# Patient Record
Sex: MALE | Race: WHITE | NOT HISPANIC OR LATINO | Employment: FULL TIME | URBAN - METROPOLITAN AREA
[De-identification: names, ages, dates, MRNs, and addresses within clinical notes are randomized per-mention and may not be internally consistent; named-entity substitution may affect disease eponyms.]

---

## 2024-04-01 ENCOUNTER — OFFICE VISIT (OUTPATIENT)
Dept: PRIMARY CARE | Facility: CLINIC | Age: 39
End: 2024-04-01
Payer: COMMERCIAL

## 2024-04-01 VITALS
HEART RATE: 77 BPM | DIASTOLIC BLOOD PRESSURE: 90 MMHG | SYSTOLIC BLOOD PRESSURE: 136 MMHG | TEMPERATURE: 98.1 F | HEIGHT: 73 IN | WEIGHT: 198.8 LBS | BODY MASS INDEX: 26.35 KG/M2

## 2024-04-01 DIAGNOSIS — Z00.00 WELLNESS EXAMINATION: ICD-10-CM

## 2024-04-01 DIAGNOSIS — Z80.9 FAMILY HX-MALIGNANCY: ICD-10-CM

## 2024-04-01 DIAGNOSIS — Z00.00 WELL ADULT HEALTH CHECK: ICD-10-CM

## 2024-04-01 DIAGNOSIS — R03.0 ELEVATED BLOOD PRESSURE READING: Primary | ICD-10-CM

## 2024-04-01 DIAGNOSIS — G47.00 INSOMNIA, UNSPECIFIED TYPE: ICD-10-CM

## 2024-04-01 PROBLEM — R07.9 CHEST PAIN: Status: ACTIVE | Noted: 2024-04-01

## 2024-04-01 PROBLEM — J06.9 URI WITH COUGH AND CONGESTION: Status: ACTIVE | Noted: 2024-04-01

## 2024-04-01 PROBLEM — R53.1 RIGHT SIDED WEAKNESS: Status: ACTIVE | Noted: 2024-04-01

## 2024-04-01 PROBLEM — M54.16 LUMBAR RADICULOPATHY: Status: ACTIVE | Noted: 2024-04-01

## 2024-04-01 PROBLEM — S60.011A CONTUSION OF RIGHT THUMB WITHOUT DAMAGE TO NAIL: Status: ACTIVE | Noted: 2024-04-01

## 2024-04-01 PROBLEM — F17.200 SMOKER: Status: ACTIVE | Noted: 2018-11-09

## 2024-04-01 PROBLEM — M54.30 SCIATICA: Status: ACTIVE | Noted: 2024-04-01

## 2024-04-01 PROBLEM — S76.311A STRAIN OF RIGHT HAMSTRING: Status: ACTIVE | Noted: 2024-04-01

## 2024-04-01 PROBLEM — M54.31 BACK PAIN WITH RIGHT-SIDED SCIATICA: Status: ACTIVE | Noted: 2024-04-01

## 2024-04-01 PROBLEM — M79.645 PAIN OF FINGER OF LEFT HAND: Status: ACTIVE | Noted: 2024-04-01

## 2024-04-01 PROBLEM — Z20.822 ENCOUNTER BY TELEHEALTH FOR SUSPECTED COVID-19: Status: ACTIVE | Noted: 2024-04-01

## 2024-04-01 PROBLEM — K21.9 GERD (GASTROESOPHAGEAL REFLUX DISEASE): Status: ACTIVE | Noted: 2024-04-01

## 2024-04-01 PROBLEM — S62.524A CLOSED NONDISPLACED FRACTURE OF DISTAL PHALANX OF RIGHT THUMB: Status: ACTIVE | Noted: 2024-04-01

## 2024-04-01 PROCEDURE — 99395 PREV VISIT EST AGE 18-39: CPT | Performed by: INTERNAL MEDICINE

## 2024-04-01 PROCEDURE — 1036F TOBACCO NON-USER: CPT | Performed by: INTERNAL MEDICINE

## 2024-04-01 RX ORDER — OMEPRAZOLE 20 MG/1
20 CAPSULE, DELAYED RELEASE ORAL
COMMUNITY

## 2024-04-01 RX ORDER — ZOLPIDEM TARTRATE 5 MG/1
5 TABLET ORAL NIGHTLY PRN
Qty: 30 TABLET | Refills: 0 | Status: SHIPPED | OUTPATIENT
Start: 2024-04-01 | End: 2024-05-01

## 2024-04-01 ASSESSMENT — PROMIS GLOBAL HEALTH SCALE
RATE_MENTAL_HEALTH: FAIR
RATE_GENERAL_HEALTH: VERY GOOD
RATE_PHYSICAL_HEALTH: FAIR
RATE_QUALITY_OF_LIFE: GOOD
CARRYOUT_PHYSICAL_ACTIVITIES: COMPLETELY
EMOTIONAL_PROBLEMS: SOMETIMES
RATE_AVERAGE_FATIGUE: MODERATE
RATE_AVERAGE_PAIN: 5
RATE_SOCIAL_SATISFACTION: GOOD
CARRYOUT_SOCIAL_ACTIVITIES: GOOD

## 2024-04-01 ASSESSMENT — PATIENT HEALTH QUESTIONNAIRE - PHQ9
2. FEELING DOWN, DEPRESSED OR HOPELESS: NOT AT ALL
SUM OF ALL RESPONSES TO PHQ9 QUESTIONS 1 AND 2: 0
1. LITTLE INTEREST OR PLEASURE IN DOING THINGS: NOT AT ALL

## 2024-04-01 NOTE — PROGRESS NOTES
Assessment/Plan   Problem List Items Addressed This Visit       Elevated blood pressure reading - Primary    Family hx-malignancy    Well adult health check    Relevant Orders    Lipid Panel    Comprehensive Metabolic Panel    Insomnia    Relevant Medications    zolpidem (Ambien) 5 mg tablet   Came for wellness visit found elevated blood pressure  Will revisit this and follow-up appointment  Nonpharmacological measures to continue  Family history of pancreatic cancer dad and aunt  for  He had recent CT scan of the abdominal and ER visit 6-month ago which was normal lipase was normal  His immunization was reviewed  Labs ordered    Subjective   Patient ID: Robi Rogers is a 38 y.o. male who presents for Annual Exam (Patient states that he has a hard time with sleeping some nights. ).    Past Surgical History:   Procedure Laterality Date    HERNIA REPAIR      OTHER SURGICAL HISTORY  2019    Hernia repair      Family History   Problem Relation Name Age of Onset    Cancer Father      Diabetes Father        Social History     Socioeconomic History    Marital status:      Spouse name: Not on file    Number of children: Not on file    Years of education: Not on file    Highest education level: Not on file   Occupational History    Not on file   Tobacco Use    Smoking status: Former     Packs/day: 1.5     Types: Cigarettes     Quit date:      Years since quitting: 3.2    Smokeless tobacco: Never   Substance and Sexual Activity    Alcohol use: Yes    Drug use: Yes     Types: Marijuana    Sexual activity: Not on file   Other Topics Concern    Not on file   Social History Narrative    Not on file     Social Determinants of Health     Financial Resource Strain: Not on file   Food Insecurity: Not on file   Transportation Needs: Not on file   Physical Activity: Not on file   Stress: Not on file   Social Connections: Not on file   Intimate Partner Violence: Not on file   Housing Stability: Not on file     "  Bee venom protein (honey bee)   Current Outpatient Medications   Medication Sig Dispense Refill    omeprazole (PriLOSEC) 20 mg DR capsule Take 1 capsule (20 mg) by mouth. Do not crush or chew.      zolpidem (Ambien) 5 mg tablet Take 1 tablet (5 mg) by mouth as needed at bedtime for sleep. 30 tablet 0     No current facility-administered medications for this visit.      Vitals:    04/01/24 1646   BP: 136/90   BP Location: Left arm   Patient Position: Sitting   Pulse: 77   Temp: 36.7 °C (98.1 °F)   Weight: 90.2 kg (198 lb 12.8 oz)   Height: 1.854 m (6' 1\")      Problem List Items Addressed This Visit       Elevated blood pressure reading - Primary    Family hx-malignancy    Well adult health check    Relevant Orders    Lipid Panel    Comprehensive Metabolic Panel    Insomnia    Relevant Medications    zolpidem (Ambien) 5 mg tablet      Orders Placed This Encounter   Procedures    Lipid Panel     Standing Status:   Future     Standing Expiration Date:   4/1/2025     Order Specific Question:   [facility]     Answer:   Immediate    Comprehensive Metabolic Panel     Standing Status:   Future     Standing Expiration Date:   4/1/2025     Order Specific Question:   Release result to [facility]     Answer:   Immediate        HPI  Came for periodic review  Is also complaining of insomnia  Otherwise fine  Mentions again family history of pancreatic cancer  ROS negative  Past medical history reviewed  Social and family history reviewed  Allergies and medications reviewed  Recent labs reviewed  Vital signs reviewed    PHYSICAL EXAM  Cardiovascular chest abdominal neurological examination normal      No results found for: \"PR1\", \"BMPR1A\", \"CMPLAS\", \"MP7NDCQH\", \"KPSAT\"   No results found for: \"CHOL\", \"LDLCALC\", \"HDLC\", \"LCTRG\", \"CHHDL\"             "

## 2024-06-18 ENCOUNTER — HOSPITAL ENCOUNTER (OUTPATIENT)
Facility: HOSPITAL | Age: 39
Setting detail: OBSERVATION
Discharge: HOME | End: 2024-06-19
Attending: STUDENT IN AN ORGANIZED HEALTH CARE EDUCATION/TRAINING PROGRAM | Admitting: STUDENT IN AN ORGANIZED HEALTH CARE EDUCATION/TRAINING PROGRAM
Payer: COMMERCIAL

## 2024-06-18 ENCOUNTER — APPOINTMENT (OUTPATIENT)
Dept: RADIOLOGY | Facility: HOSPITAL | Age: 39
End: 2024-06-18
Payer: COMMERCIAL

## 2024-06-18 ENCOUNTER — ANESTHESIA EVENT (OUTPATIENT)
Dept: OPERATING ROOM | Facility: HOSPITAL | Age: 39
End: 2024-06-18
Payer: COMMERCIAL

## 2024-06-18 ENCOUNTER — ANESTHESIA (OUTPATIENT)
Dept: OPERATING ROOM | Facility: HOSPITAL | Age: 39
End: 2024-06-18
Payer: COMMERCIAL

## 2024-06-18 DIAGNOSIS — K35.80 ACUTE APPENDICITIS, UNSPECIFIED ACUTE APPENDICITIS TYPE: Primary | ICD-10-CM

## 2024-06-18 LAB
ALBUMIN SERPL BCP-MCNC: 4.7 G/DL (ref 3.4–5)
ALP SERPL-CCNC: 83 U/L (ref 33–120)
ALT SERPL W P-5'-P-CCNC: 42 U/L (ref 10–52)
ANION GAP SERPL CALC-SCNC: 12 MMOL/L (ref 10–20)
APPEARANCE UR: CLEAR
AST SERPL W P-5'-P-CCNC: 25 U/L (ref 9–39)
BASOPHILS # BLD AUTO: 0.05 X10*3/UL (ref 0–0.1)
BASOPHILS NFR BLD AUTO: 0.7 %
BILIRUB SERPL-MCNC: 0.5 MG/DL (ref 0–1.2)
BILIRUB UR STRIP.AUTO-MCNC: NEGATIVE MG/DL
BUN SERPL-MCNC: 14 MG/DL (ref 6–23)
CALCIUM SERPL-MCNC: 9.5 MG/DL (ref 8.6–10.3)
CHLORIDE SERPL-SCNC: 106 MMOL/L (ref 98–107)
CO2 SERPL-SCNC: 25 MMOL/L (ref 21–32)
COLOR UR: ABNORMAL
CREAT SERPL-MCNC: 0.92 MG/DL (ref 0.5–1.3)
EGFRCR SERPLBLD CKD-EPI 2021: >90 ML/MIN/1.73M*2
EOSINOPHIL # BLD AUTO: 0.18 X10*3/UL (ref 0–0.7)
EOSINOPHIL NFR BLD AUTO: 2.7 %
ERYTHROCYTE [DISTWIDTH] IN BLOOD BY AUTOMATED COUNT: 12.9 % (ref 11.5–14.5)
GLUCOSE SERPL-MCNC: 110 MG/DL (ref 74–99)
GLUCOSE UR STRIP.AUTO-MCNC: NORMAL MG/DL
HCT VFR BLD AUTO: 46.2 % (ref 41–52)
HGB BLD-MCNC: 15.7 G/DL (ref 13.5–17.5)
IMM GRANULOCYTES # BLD AUTO: 0.03 X10*3/UL (ref 0–0.7)
IMM GRANULOCYTES NFR BLD AUTO: 0.4 % (ref 0–0.9)
KETONES UR STRIP.AUTO-MCNC: NEGATIVE MG/DL
LEUKOCYTE ESTERASE UR QL STRIP.AUTO: NEGATIVE
LIPASE SERPL-CCNC: 21 U/L (ref 9–82)
LYMPHOCYTES # BLD AUTO: 2.18 X10*3/UL (ref 1.2–4.8)
LYMPHOCYTES NFR BLD AUTO: 32.3 %
MCH RBC QN AUTO: 29.7 PG (ref 26–34)
MCHC RBC AUTO-ENTMCNC: 34 G/DL (ref 32–36)
MCV RBC AUTO: 87 FL (ref 80–100)
MONOCYTES # BLD AUTO: 0.41 X10*3/UL (ref 0.1–1)
MONOCYTES NFR BLD AUTO: 6.1 %
NEUTROPHILS # BLD AUTO: 3.89 X10*3/UL (ref 1.2–7.7)
NEUTROPHILS NFR BLD AUTO: 57.8 %
NITRITE UR QL STRIP.AUTO: NEGATIVE
NRBC BLD-RTO: 0 /100 WBCS (ref 0–0)
PH UR STRIP.AUTO: 5.5 [PH]
PLATELET # BLD AUTO: 271 X10*3/UL (ref 150–450)
POTASSIUM SERPL-SCNC: 3.7 MMOL/L (ref 3.5–5.3)
PROT SERPL-MCNC: 7.6 G/DL (ref 6.4–8.2)
PROT UR STRIP.AUTO-MCNC: NEGATIVE MG/DL
RBC # BLD AUTO: 5.29 X10*6/UL (ref 4.5–5.9)
RBC # UR STRIP.AUTO: ABNORMAL /UL
RBC #/AREA URNS AUTO: NORMAL /HPF
SODIUM SERPL-SCNC: 139 MMOL/L (ref 136–145)
SP GR UR STRIP.AUTO: 1.03
UROBILINOGEN UR STRIP.AUTO-MCNC: NORMAL MG/DL
WBC # BLD AUTO: 6.7 X10*3/UL (ref 4.4–11.3)
WBC #/AREA URNS AUTO: NORMAL /HPF

## 2024-06-18 PROCEDURE — 88304 TISSUE EXAM BY PATHOLOGIST: CPT | Performed by: PATHOLOGY

## 2024-06-18 PROCEDURE — 3600000009 HC OR TIME - EACH INCREMENTAL 1 MINUTE - PROCEDURE LEVEL FOUR: Performed by: STUDENT IN AN ORGANIZED HEALTH CARE EDUCATION/TRAINING PROGRAM

## 2024-06-18 PROCEDURE — 85025 COMPLETE CBC W/AUTO DIFF WBC: CPT | Performed by: STUDENT IN AN ORGANIZED HEALTH CARE EDUCATION/TRAINING PROGRAM

## 2024-06-18 PROCEDURE — 99221 1ST HOSP IP/OBS SF/LOW 40: CPT | Performed by: STUDENT IN AN ORGANIZED HEALTH CARE EDUCATION/TRAINING PROGRAM

## 2024-06-18 PROCEDURE — 36415 COLL VENOUS BLD VENIPUNCTURE: CPT | Performed by: EMERGENCY MEDICINE

## 2024-06-18 PROCEDURE — 7100000002 HC RECOVERY ROOM TIME - EACH INCREMENTAL 1 MINUTE: Performed by: STUDENT IN AN ORGANIZED HEALTH CARE EDUCATION/TRAINING PROGRAM

## 2024-06-18 PROCEDURE — A4217 STERILE WATER/SALINE, 500 ML: HCPCS | Performed by: STUDENT IN AN ORGANIZED HEALTH CARE EDUCATION/TRAINING PROGRAM

## 2024-06-18 PROCEDURE — 2500000004 HC RX 250 GENERAL PHARMACY W/ HCPCS (ALT 636 FOR OP/ED): Performed by: NURSE ANESTHETIST, CERTIFIED REGISTERED

## 2024-06-18 PROCEDURE — 80053 COMPREHEN METABOLIC PANEL: CPT | Performed by: STUDENT IN AN ORGANIZED HEALTH CARE EDUCATION/TRAINING PROGRAM

## 2024-06-18 PROCEDURE — 81001 URINALYSIS AUTO W/SCOPE: CPT | Performed by: EMERGENCY MEDICINE

## 2024-06-18 PROCEDURE — 3600000004 HC OR TIME - INITIAL BASE CHARGE - PROCEDURE LEVEL FOUR: Performed by: STUDENT IN AN ORGANIZED HEALTH CARE EDUCATION/TRAINING PROGRAM

## 2024-06-18 PROCEDURE — 81001 URINALYSIS AUTO W/SCOPE: CPT | Performed by: STUDENT IN AN ORGANIZED HEALTH CARE EDUCATION/TRAINING PROGRAM

## 2024-06-18 PROCEDURE — 84075 ASSAY ALKALINE PHOSPHATASE: CPT | Performed by: EMERGENCY MEDICINE

## 2024-06-18 PROCEDURE — 83690 ASSAY OF LIPASE: CPT | Performed by: EMERGENCY MEDICINE

## 2024-06-18 PROCEDURE — 3700000002 HC GENERAL ANESTHESIA TIME - EACH INCREMENTAL 1 MINUTE: Performed by: STUDENT IN AN ORGANIZED HEALTH CARE EDUCATION/TRAINING PROGRAM

## 2024-06-18 PROCEDURE — 96365 THER/PROPH/DIAG IV INF INIT: CPT | Mod: 59 | Performed by: STUDENT IN AN ORGANIZED HEALTH CARE EDUCATION/TRAINING PROGRAM

## 2024-06-18 PROCEDURE — 99285 EMERGENCY DEPT VISIT HI MDM: CPT | Performed by: STUDENT IN AN ORGANIZED HEALTH CARE EDUCATION/TRAINING PROGRAM

## 2024-06-18 PROCEDURE — 74177 CT ABD & PELVIS W/CONTRAST: CPT

## 2024-06-18 PROCEDURE — 2500000004 HC RX 250 GENERAL PHARMACY W/ HCPCS (ALT 636 FOR OP/ED): Performed by: STUDENT IN AN ORGANIZED HEALTH CARE EDUCATION/TRAINING PROGRAM

## 2024-06-18 PROCEDURE — 3700000001 HC GENERAL ANESTHESIA TIME - INITIAL BASE CHARGE: Performed by: STUDENT IN AN ORGANIZED HEALTH CARE EDUCATION/TRAINING PROGRAM

## 2024-06-18 PROCEDURE — 99285 EMERGENCY DEPT VISIT HI MDM: CPT

## 2024-06-18 PROCEDURE — 83690 ASSAY OF LIPASE: CPT | Performed by: STUDENT IN AN ORGANIZED HEALTH CARE EDUCATION/TRAINING PROGRAM

## 2024-06-18 PROCEDURE — 7100000001 HC RECOVERY ROOM TIME - INITIAL BASE CHARGE: Performed by: STUDENT IN AN ORGANIZED HEALTH CARE EDUCATION/TRAINING PROGRAM

## 2024-06-18 PROCEDURE — 96375 TX/PRO/DX INJ NEW DRUG ADDON: CPT | Performed by: STUDENT IN AN ORGANIZED HEALTH CARE EDUCATION/TRAINING PROGRAM

## 2024-06-18 PROCEDURE — 0752T DGTZ GLS MCRSCP SLD LVL III: CPT | Mod: TC,STJLAB | Performed by: STUDENT IN AN ORGANIZED HEALTH CARE EDUCATION/TRAINING PROGRAM

## 2024-06-18 PROCEDURE — 44970 LAPAROSCOPY APPENDECTOMY: CPT | Performed by: STUDENT IN AN ORGANIZED HEALTH CARE EDUCATION/TRAINING PROGRAM

## 2024-06-18 PROCEDURE — 2550000001 HC RX 255 CONTRASTS: Performed by: STUDENT IN AN ORGANIZED HEALTH CARE EDUCATION/TRAINING PROGRAM

## 2024-06-18 PROCEDURE — 85025 COMPLETE CBC W/AUTO DIFF WBC: CPT | Performed by: EMERGENCY MEDICINE

## 2024-06-18 PROCEDURE — 2720000007 HC OR 272 NO HCPCS: Performed by: STUDENT IN AN ORGANIZED HEALTH CARE EDUCATION/TRAINING PROGRAM

## 2024-06-18 PROCEDURE — 74177 CT ABD & PELVIS W/CONTRAST: CPT | Performed by: RADIOLOGY

## 2024-06-18 PROCEDURE — G0378 HOSPITAL OBSERVATION PER HR: HCPCS

## 2024-06-18 PROCEDURE — 2500000005 HC RX 250 GENERAL PHARMACY W/O HCPCS: Performed by: NURSE ANESTHETIST, CERTIFIED REGISTERED

## 2024-06-18 PROCEDURE — 96361 HYDRATE IV INFUSION ADD-ON: CPT | Performed by: STUDENT IN AN ORGANIZED HEALTH CARE EDUCATION/TRAINING PROGRAM

## 2024-06-18 RX ORDER — FENTANYL CITRATE 50 UG/ML
25 INJECTION, SOLUTION INTRAMUSCULAR; INTRAVENOUS EVERY 5 MIN PRN
Status: DISCONTINUED | OUTPATIENT
Start: 2024-06-18 | End: 2024-06-18 | Stop reason: HOSPADM

## 2024-06-18 RX ORDER — NALOXONE HYDROCHLORIDE 0.4 MG/ML
0.2 INJECTION, SOLUTION INTRAMUSCULAR; INTRAVENOUS; SUBCUTANEOUS EVERY 5 MIN PRN
Status: DISCONTINUED | OUTPATIENT
Start: 2024-06-18 | End: 2024-06-19 | Stop reason: HOSPADM

## 2024-06-18 RX ORDER — SODIUM CHLORIDE, SODIUM LACTATE, POTASSIUM CHLORIDE, CALCIUM CHLORIDE 600; 310; 30; 20 MG/100ML; MG/100ML; MG/100ML; MG/100ML
50 INJECTION, SOLUTION INTRAVENOUS CONTINUOUS
Status: DISCONTINUED | OUTPATIENT
Start: 2024-06-19 | End: 2024-06-19

## 2024-06-18 RX ORDER — LABETALOL HYDROCHLORIDE 5 MG/ML
5 INJECTION, SOLUTION INTRAVENOUS ONCE AS NEEDED
Status: DISCONTINUED | OUTPATIENT
Start: 2024-06-18 | End: 2024-06-18 | Stop reason: HOSPADM

## 2024-06-18 RX ORDER — SODIUM CHLORIDE, SODIUM LACTATE, POTASSIUM CHLORIDE, CALCIUM CHLORIDE 600; 310; 30; 20 MG/100ML; MG/100ML; MG/100ML; MG/100ML
INJECTION, SOLUTION INTRAVENOUS CONTINUOUS PRN
Status: DISCONTINUED | OUTPATIENT
Start: 2024-06-18 | End: 2024-06-18

## 2024-06-18 RX ORDER — MIDAZOLAM HYDROCHLORIDE 1 MG/ML
INJECTION, SOLUTION INTRAMUSCULAR; INTRAVENOUS AS NEEDED
Status: DISCONTINUED | OUTPATIENT
Start: 2024-06-18 | End: 2024-06-18

## 2024-06-18 RX ORDER — ONDANSETRON 4 MG/1
4 TABLET, ORALLY DISINTEGRATING ORAL EVERY 8 HOURS PRN
Status: DISCONTINUED | OUTPATIENT
Start: 2024-06-18 | End: 2024-06-19 | Stop reason: HOSPADM

## 2024-06-18 RX ORDER — ONDANSETRON HYDROCHLORIDE 2 MG/ML
4 INJECTION, SOLUTION INTRAVENOUS ONCE AS NEEDED
Status: DISCONTINUED | OUTPATIENT
Start: 2024-06-18 | End: 2024-06-18 | Stop reason: HOSPADM

## 2024-06-18 RX ORDER — ROCURONIUM BROMIDE 50 MG/5 ML
SYRINGE (ML) INTRAVENOUS AS NEEDED
Status: DISCONTINUED | OUTPATIENT
Start: 2024-06-18 | End: 2024-06-18

## 2024-06-18 RX ORDER — FENTANYL CITRATE 50 UG/ML
INJECTION, SOLUTION INTRAMUSCULAR; INTRAVENOUS AS NEEDED
Status: DISCONTINUED | OUTPATIENT
Start: 2024-06-18 | End: 2024-06-18

## 2024-06-18 RX ORDER — ACETAMINOPHEN 325 MG/1
650 TABLET ORAL EVERY 6 HOURS
Status: DISCONTINUED | OUTPATIENT
Start: 2024-06-19 | End: 2024-06-19 | Stop reason: HOSPADM

## 2024-06-18 RX ORDER — ALBUTEROL SULFATE 0.83 MG/ML
2.5 SOLUTION RESPIRATORY (INHALATION) ONCE AS NEEDED
Status: DISCONTINUED | OUTPATIENT
Start: 2024-06-18 | End: 2024-06-18 | Stop reason: HOSPADM

## 2024-06-18 RX ORDER — ONDANSETRON HYDROCHLORIDE 2 MG/ML
INJECTION, SOLUTION INTRAVENOUS AS NEEDED
Status: DISCONTINUED | OUTPATIENT
Start: 2024-06-18 | End: 2024-06-18

## 2024-06-18 RX ORDER — PANTOPRAZOLE SODIUM 40 MG/1
40 TABLET, DELAYED RELEASE ORAL
Status: DISCONTINUED | OUTPATIENT
Start: 2024-06-19 | End: 2024-06-19 | Stop reason: HOSPADM

## 2024-06-18 RX ORDER — OXYCODONE HYDROCHLORIDE 5 MG/1
5 TABLET ORAL EVERY 6 HOURS PRN
Status: DISCONTINUED | OUTPATIENT
Start: 2024-06-18 | End: 2024-06-19 | Stop reason: HOSPADM

## 2024-06-18 RX ORDER — LIDOCAINE HYDROCHLORIDE 10 MG/ML
0.1 INJECTION, SOLUTION EPIDURAL; INFILTRATION; INTRACAUDAL; PERINEURAL ONCE
Status: CANCELLED | OUTPATIENT
Start: 2024-06-18 | End: 2024-06-18

## 2024-06-18 RX ORDER — OXYCODONE HYDROCHLORIDE 5 MG/1
5 TABLET ORAL EVERY 4 HOURS PRN
Status: DISCONTINUED | OUTPATIENT
Start: 2024-06-18 | End: 2024-06-18 | Stop reason: HOSPADM

## 2024-06-18 RX ORDER — ONDANSETRON HYDROCHLORIDE 2 MG/ML
4 INJECTION, SOLUTION INTRAVENOUS EVERY 8 HOURS PRN
Status: DISCONTINUED | OUTPATIENT
Start: 2024-06-18 | End: 2024-06-19 | Stop reason: HOSPADM

## 2024-06-18 RX ORDER — MEPERIDINE HYDROCHLORIDE 50 MG/ML
12.5 INJECTION INTRAMUSCULAR; INTRAVENOUS; SUBCUTANEOUS EVERY 10 MIN PRN
Status: DISCONTINUED | OUTPATIENT
Start: 2024-06-18 | End: 2024-06-18 | Stop reason: HOSPADM

## 2024-06-18 RX ORDER — SODIUM CHLORIDE, SODIUM LACTATE, POTASSIUM CHLORIDE, CALCIUM CHLORIDE 600; 310; 30; 20 MG/100ML; MG/100ML; MG/100ML; MG/100ML
100 INJECTION, SOLUTION INTRAVENOUS CONTINUOUS
Status: DISCONTINUED | OUTPATIENT
Start: 2024-06-18 | End: 2024-06-18 | Stop reason: HOSPADM

## 2024-06-18 RX ORDER — LIDOCAINE HYDROCHLORIDE 20 MG/ML
INJECTION, SOLUTION INFILTRATION; PERINEURAL AS NEEDED
Status: DISCONTINUED | OUTPATIENT
Start: 2024-06-18 | End: 2024-06-18

## 2024-06-18 RX ORDER — BUPIVACAINE HYDROCHLORIDE 5 MG/ML
INJECTION, SOLUTION EPIDURAL; INTRACAUDAL AS NEEDED
Status: DISCONTINUED | OUTPATIENT
Start: 2024-06-18 | End: 2024-06-18 | Stop reason: HOSPADM

## 2024-06-18 RX ORDER — KETOROLAC TROMETHAMINE 15 MG/ML
15 INJECTION, SOLUTION INTRAMUSCULAR; INTRAVENOUS ONCE
Status: COMPLETED | OUTPATIENT
Start: 2024-06-18 | End: 2024-06-18

## 2024-06-18 RX ORDER — MORPHINE SULFATE 4 MG/ML
4 INJECTION, SOLUTION INTRAMUSCULAR; INTRAVENOUS EVERY 4 HOURS PRN
Status: DISCONTINUED | OUTPATIENT
Start: 2024-06-18 | End: 2024-06-19 | Stop reason: HOSPADM

## 2024-06-18 RX ORDER — HEPARIN SODIUM 5000 [USP'U]/ML
5000 INJECTION, SOLUTION INTRAVENOUS; SUBCUTANEOUS EVERY 8 HOURS SCHEDULED
Status: DISCONTINUED | OUTPATIENT
Start: 2024-06-19 | End: 2024-06-19 | Stop reason: HOSPADM

## 2024-06-18 RX ORDER — PROPOFOL 10 MG/ML
INJECTION, EMULSION INTRAVENOUS AS NEEDED
Status: DISCONTINUED | OUTPATIENT
Start: 2024-06-18 | End: 2024-06-18

## 2024-06-18 RX ORDER — SODIUM CHLORIDE 0.9 G/100ML
IRRIGANT IRRIGATION AS NEEDED
Status: DISCONTINUED | OUTPATIENT
Start: 2024-06-18 | End: 2024-06-18 | Stop reason: HOSPADM

## 2024-06-18 RX ORDER — ONDANSETRON HYDROCHLORIDE 2 MG/ML
4 INJECTION, SOLUTION INTRAVENOUS ONCE
Status: COMPLETED | OUTPATIENT
Start: 2024-06-18 | End: 2024-06-18

## 2024-06-18 RX ORDER — PANTOPRAZOLE SODIUM 40 MG/10ML
40 INJECTION, POWDER, LYOPHILIZED, FOR SOLUTION INTRAVENOUS
Status: DISCONTINUED | OUTPATIENT
Start: 2024-06-19 | End: 2024-06-19 | Stop reason: HOSPADM

## 2024-06-18 SDOH — SOCIAL STABILITY: SOCIAL INSECURITY: HAVE YOU HAD THOUGHTS OF HARMING ANYONE ELSE?: NO

## 2024-06-18 SDOH — SOCIAL STABILITY: SOCIAL INSECURITY: WERE YOU ABLE TO COMPLETE ALL THE BEHAVIORAL HEALTH SCREENINGS?: YES

## 2024-06-18 SDOH — SOCIAL STABILITY: SOCIAL INSECURITY: ARE YOU OR HAVE YOU BEEN THREATENED OR ABUSED PHYSICALLY, EMOTIONALLY, OR SEXUALLY BY ANYONE?: NO

## 2024-06-18 SDOH — SOCIAL STABILITY: SOCIAL INSECURITY: ARE THERE ANY APPARENT SIGNS OF INJURIES/BEHAVIORS THAT COULD BE RELATED TO ABUSE/NEGLECT?: NO

## 2024-06-18 SDOH — SOCIAL STABILITY: SOCIAL INSECURITY: DO YOU FEEL ANYONE HAS EXPLOITED OR TAKEN ADVANTAGE OF YOU FINANCIALLY OR OF YOUR PERSONAL PROPERTY?: NO

## 2024-06-18 SDOH — SOCIAL STABILITY: SOCIAL INSECURITY: DO YOU FEEL UNSAFE GOING BACK TO THE PLACE WHERE YOU ARE LIVING?: NO

## 2024-06-18 SDOH — HEALTH STABILITY: MENTAL HEALTH: CURRENT SMOKER: 0

## 2024-06-18 SDOH — SOCIAL STABILITY: SOCIAL INSECURITY: ABUSE: ADULT

## 2024-06-18 SDOH — SOCIAL STABILITY: SOCIAL INSECURITY: HAS ANYONE EVER THREATENED TO HURT YOUR FAMILY OR YOUR PETS?: NO

## 2024-06-18 SDOH — SOCIAL STABILITY: SOCIAL INSECURITY: DOES ANYONE TRY TO KEEP YOU FROM HAVING/CONTACTING OTHER FRIENDS OR DOING THINGS OUTSIDE YOUR HOME?: NO

## 2024-06-18 SDOH — SOCIAL STABILITY: SOCIAL INSECURITY: HAVE YOU HAD ANY THOUGHTS OF HARMING ANYONE ELSE?: NO

## 2024-06-18 ASSESSMENT — ACTIVITIES OF DAILY LIVING (ADL)
GROOMING: INDEPENDENT
ADEQUATE_TO_COMPLETE_ADL: YES
WALKS IN HOME: INDEPENDENT
ADEQUATE_TO_COMPLETE_ADL: YES
LACK_OF_TRANSPORTATION: NO
DRESSING YOURSELF: INDEPENDENT
PATIENT'S MEMORY ADEQUATE TO SAFELY COMPLETE DAILY ACTIVITIES?: YES
PATIENT'S MEMORY ADEQUATE TO SAFELY COMPLETE DAILY ACTIVITIES?: YES
TOILETING: INDEPENDENT
BATHING: INDEPENDENT
FEEDING YOURSELF: INDEPENDENT
WALKS IN HOME: INDEPENDENT
HEARING - RIGHT EAR: FUNCTIONAL
GROOMING: INDEPENDENT
HEARING - LEFT EAR: FUNCTIONAL
HEARING - LEFT EAR: FUNCTIONAL
JUDGMENT_ADEQUATE_SAFELY_COMPLETE_DAILY_ACTIVITIES: YES
HEARING - RIGHT EAR: FUNCTIONAL
FEEDING YOURSELF: INDEPENDENT
BATHING: INDEPENDENT
DRESSING YOURSELF: INDEPENDENT
TOILETING: INDEPENDENT
JUDGMENT_ADEQUATE_SAFELY_COMPLETE_DAILY_ACTIVITIES: YES

## 2024-06-18 ASSESSMENT — COGNITIVE AND FUNCTIONAL STATUS - GENERAL
DAILY ACTIVITIY SCORE: 24
PATIENT BASELINE BEDBOUND: NO
DAILY ACTIVITIY SCORE: 24
MOBILITY SCORE: 24
MOBILITY SCORE: 24

## 2024-06-18 ASSESSMENT — ENCOUNTER SYMPTOMS
WHEEZING: 0
CHOKING: 0
NAUSEA: 1
DIZZINESS: 0
CHILLS: 1
DIARRHEA: 1
APPETITE CHANGE: 0
UNEXPECTED WEIGHT CHANGE: 0
RECTAL PAIN: 0
VOMITING: 0
ANAL BLEEDING: 0
ABDOMINAL DISTENTION: 0
FREQUENCY: 0
FEVER: 1
CONSTIPATION: 0
FATIGUE: 0
ABDOMINAL PAIN: 1
DIAPHORESIS: 1
DIFFICULTY URINATING: 0
AGITATION: 0
PALPITATIONS: 0
WEAKNESS: 0
DYSURIA: 0
ACTIVITY CHANGE: 0
COUGH: 0
BLOOD IN STOOL: 0
SHORTNESS OF BREATH: 0
LIGHT-HEADEDNESS: 0
CHEST TIGHTNESS: 0
HEMATURIA: 0

## 2024-06-18 ASSESSMENT — COLUMBIA-SUICIDE SEVERITY RATING SCALE - C-SSRS
2. HAVE YOU ACTUALLY HAD ANY THOUGHTS OF KILLING YOURSELF?: NO
1. IN THE PAST MONTH, HAVE YOU WISHED YOU WERE DEAD OR WISHED YOU COULD GO TO SLEEP AND NOT WAKE UP?: NO
6. HAVE YOU EVER DONE ANYTHING, STARTED TO DO ANYTHING, OR PREPARED TO DO ANYTHING TO END YOUR LIFE?: NO

## 2024-06-18 ASSESSMENT — PAIN DESCRIPTION - LOCATION
LOCATION: ABDOMEN

## 2024-06-18 ASSESSMENT — PATIENT HEALTH QUESTIONNAIRE - PHQ9
SUM OF ALL RESPONSES TO PHQ9 QUESTIONS 1 & 2: 0
2. FEELING DOWN, DEPRESSED OR HOPELESS: NOT AT ALL
1. LITTLE INTEREST OR PLEASURE IN DOING THINGS: NOT AT ALL

## 2024-06-18 ASSESSMENT — PAIN - FUNCTIONAL ASSESSMENT
PAIN_FUNCTIONAL_ASSESSMENT: 0-10

## 2024-06-18 ASSESSMENT — PAIN SCALES - GENERAL
PAINLEVEL_OUTOF10: 6
PAINLEVEL_OUTOF10: 0 - NO PAIN
PAIN_LEVEL: 0
PAINLEVEL_OUTOF10: 5 - MODERATE PAIN
PAINLEVEL_OUTOF10: 4
PAINLEVEL_OUTOF10: 0 - NO PAIN
PAINLEVEL_OUTOF10: 6
PAINLEVEL_OUTOF10: 0 - NO PAIN
PAINLEVEL_OUTOF10: 0 - NO PAIN

## 2024-06-18 ASSESSMENT — LIFESTYLE VARIABLES
HAVE PEOPLE ANNOYED YOU BY CRITICIZING YOUR DRINKING: NO
HOW OFTEN DO YOU HAVE 6 OR MORE DRINKS ON ONE OCCASION: NEVER
HOW OFTEN DO YOU HAVE A DRINK CONTAINING ALCOHOL: 2-4 TIMES A MONTH
SKIP TO QUESTIONS 9-10: 1
HOW MANY STANDARD DRINKS CONTAINING ALCOHOL DO YOU HAVE ON A TYPICAL DAY: 1 OR 2
EVER HAD A DRINK FIRST THING IN THE MORNING TO STEADY YOUR NERVES TO GET RID OF A HANGOVER: NO
EVER FELT BAD OR GUILTY ABOUT YOUR DRINKING: NO
HAVE YOU EVER FELT YOU SHOULD CUT DOWN ON YOUR DRINKING: NO
TOTAL SCORE: 0
AUDIT-C TOTAL SCORE: 2
AUDIT-C TOTAL SCORE: 2

## 2024-06-18 ASSESSMENT — PAIN DESCRIPTION - DESCRIPTORS
DESCRIPTORS: CRAMPING;SORE
DESCRIPTORS: CRAMPING

## 2024-06-18 ASSESSMENT — PAIN DESCRIPTION - ORIENTATION: ORIENTATION: RIGHT

## 2024-06-18 NOTE — ED PROVIDER NOTES
HPI   Chief Complaint   Patient presents with    Abdominal Pain     Sharp R abd pain starting at 0400    Flank Pain     R flank pain       38-year-old male presenting to the ED for right-sided abdominal pain.  Patient states he was woken from sleep about 2 days ago with right-sided back and flank pain.  States his pain has been persistent and is now radiating into the right side of his abdomen.  Endorses nausea and decreased appetite without vomiting, changes in stools, bloody stools, dysuria, hematuria, fevers.  Did have an episode of diaphoresis today which prompted him to come to the ED for evaluation.  No recent travel, sick contacts, or recent flulike symptoms.                          Mallory Coma Scale Score: 15                     Patient History   History reviewed. No pertinent past medical history.  Past Surgical History:   Procedure Laterality Date    HERNIA REPAIR      OTHER SURGICAL HISTORY  12/17/2019    Hernia repair     Family History   Problem Relation Name Age of Onset    Cancer Father      Diabetes Father       Social History     Tobacco Use    Smoking status: Former     Current packs/day: 0.00     Types: Cigarettes     Quit date: 2021     Years since quitting: 3.4    Smokeless tobacco: Never   Vaping Use    Vaping status: Every Day    Substances: Nicotine   Substance Use Topics    Alcohol use: Yes     Comment: 2-3 beers daily    Drug use: Yes     Types: Marijuana       Physical Exam   ED Triage Vitals [06/18/24 1412]   Temperature Heart Rate Respirations BP   36.5 °C (97.7 °F) 81 18 (!) 166/104      Pulse Ox Temp Source Heart Rate Source Patient Position   100 % Temporal -- --      BP Location FiO2 (%)     -- --       Physical Exam  Vitals and nursing note reviewed.   Constitutional:       General: He is not in acute distress.     Appearance: He is not ill-appearing or toxic-appearing.   HENT:      Head: Normocephalic and atraumatic.      Nose: Nose normal.      Mouth/Throat:      Mouth: Mucous  membranes are moist.   Eyes:      Extraocular Movements: Extraocular movements intact.      Conjunctiva/sclera: Conjunctivae normal.   Cardiovascular:      Rate and Rhythm: Normal rate and regular rhythm.      Pulses: Normal pulses.      Heart sounds: Normal heart sounds.   Pulmonary:      Effort: Pulmonary effort is normal.      Breath sounds: Normal breath sounds.   Abdominal:      General: There is no distension.      Palpations: Abdomen is soft.      Tenderness: There is abdominal tenderness in the right lower quadrant. There is no right CVA tenderness or left CVA tenderness.   Musculoskeletal:         General: Normal range of motion.      Cervical back: Normal range of motion and neck supple.      Right lower leg: No edema.      Left lower leg: No edema.   Skin:     General: Skin is warm and dry.      Capillary Refill: Capillary refill takes less than 2 seconds.   Neurological:      General: No focal deficit present.      Mental Status: He is alert and oriented to person, place, and time. Mental status is at baseline.         ED Course & MDM   Diagnoses as of 06/18/24 2351   Acute appendicitis, unspecified acute appendicitis type       Medical Decision Making  38-year-old male presenting to the ED for right-sided abdominal pain.  Patient is nontoxic-appearing.  Does have right lower quadrant tenderness on palpation without peritoneal signs.  Treated with Toradol, Zofran and IV fluids.  Will obtain labs and CT to rule out appendicitis versus kidney stone.    CT shows evidence of acute appendicitis.  Labs are noncontributory.  Patient started on Zosyn and admitted to the surgery service with Dr. Vargas for surgical management.        Procedure  Procedures     Elvis Marti DO  Resident  06/18/24 2351

## 2024-06-19 VITALS
HEART RATE: 65 BPM | WEIGHT: 180 LBS | OXYGEN SATURATION: 98 % | DIASTOLIC BLOOD PRESSURE: 85 MMHG | TEMPERATURE: 96.8 F | BODY MASS INDEX: 23.86 KG/M2 | SYSTOLIC BLOOD PRESSURE: 140 MMHG | RESPIRATION RATE: 18 BRPM | HEIGHT: 73 IN

## 2024-06-19 LAB
ANION GAP SERPL CALC-SCNC: 14 MMOL/L (ref 10–20)
BUN SERPL-MCNC: 11 MG/DL (ref 6–23)
CALCIUM SERPL-MCNC: 9 MG/DL (ref 8.6–10.3)
CHLORIDE SERPL-SCNC: 104 MMOL/L (ref 98–107)
CO2 SERPL-SCNC: 23 MMOL/L (ref 21–32)
CREAT SERPL-MCNC: 0.9 MG/DL (ref 0.5–1.3)
EGFRCR SERPLBLD CKD-EPI 2021: >90 ML/MIN/1.73M*2
ERYTHROCYTE [DISTWIDTH] IN BLOOD BY AUTOMATED COUNT: 13 % (ref 11.5–14.5)
GLUCOSE SERPL-MCNC: 129 MG/DL (ref 74–99)
HCT VFR BLD AUTO: 43.8 % (ref 41–52)
HGB BLD-MCNC: 14.7 G/DL (ref 13.5–17.5)
MCH RBC QN AUTO: 29.6 PG (ref 26–34)
MCHC RBC AUTO-ENTMCNC: 33.6 G/DL (ref 32–36)
MCV RBC AUTO: 88 FL (ref 80–100)
NRBC BLD-RTO: 0 /100 WBCS (ref 0–0)
PLATELET # BLD AUTO: 249 X10*3/UL (ref 150–450)
POTASSIUM SERPL-SCNC: 3.9 MMOL/L (ref 3.5–5.3)
RBC # BLD AUTO: 4.97 X10*6/UL (ref 4.5–5.9)
SODIUM SERPL-SCNC: 137 MMOL/L (ref 136–145)
WBC # BLD AUTO: 8.4 X10*3/UL (ref 4.4–11.3)

## 2024-06-19 PROCEDURE — 2500000001 HC RX 250 WO HCPCS SELF ADMINISTERED DRUGS (ALT 637 FOR MEDICARE OP): Performed by: STUDENT IN AN ORGANIZED HEALTH CARE EDUCATION/TRAINING PROGRAM

## 2024-06-19 PROCEDURE — 99024 POSTOP FOLLOW-UP VISIT: CPT | Performed by: STUDENT IN AN ORGANIZED HEALTH CARE EDUCATION/TRAINING PROGRAM

## 2024-06-19 PROCEDURE — G0378 HOSPITAL OBSERVATION PER HR: HCPCS

## 2024-06-19 PROCEDURE — 36415 COLL VENOUS BLD VENIPUNCTURE: CPT | Performed by: STUDENT IN AN ORGANIZED HEALTH CARE EDUCATION/TRAINING PROGRAM

## 2024-06-19 PROCEDURE — 82374 ASSAY BLOOD CARBON DIOXIDE: CPT | Performed by: STUDENT IN AN ORGANIZED HEALTH CARE EDUCATION/TRAINING PROGRAM

## 2024-06-19 PROCEDURE — 2500000004 HC RX 250 GENERAL PHARMACY W/ HCPCS (ALT 636 FOR OP/ED): Performed by: STUDENT IN AN ORGANIZED HEALTH CARE EDUCATION/TRAINING PROGRAM

## 2024-06-19 PROCEDURE — 96372 THER/PROPH/DIAG INJ SC/IM: CPT | Performed by: STUDENT IN AN ORGANIZED HEALTH CARE EDUCATION/TRAINING PROGRAM

## 2024-06-19 PROCEDURE — 85027 COMPLETE CBC AUTOMATED: CPT | Performed by: STUDENT IN AN ORGANIZED HEALTH CARE EDUCATION/TRAINING PROGRAM

## 2024-06-19 PROCEDURE — 96366 THER/PROPH/DIAG IV INF ADDON: CPT | Performed by: STUDENT IN AN ORGANIZED HEALTH CARE EDUCATION/TRAINING PROGRAM

## 2024-06-19 RX ORDER — OXYCODONE AND ACETAMINOPHEN 5; 325 MG/1; MG/1
1 TABLET ORAL EVERY 6 HOURS PRN
Qty: 6 TABLET | Refills: 0 | Status: SHIPPED | OUTPATIENT
Start: 2024-06-19 | End: 2024-06-21

## 2024-06-19 RX ORDER — AMOXICILLIN AND CLAVULANATE POTASSIUM 875; 125 MG/1; MG/1
875 TABLET, FILM COATED ORAL 2 TIMES DAILY
Qty: 14 TABLET | Refills: 0 | Status: SHIPPED | OUTPATIENT
Start: 2024-06-19 | End: 2024-06-26

## 2024-06-19 ASSESSMENT — PAIN - FUNCTIONAL ASSESSMENT
PAIN_FUNCTIONAL_ASSESSMENT: 0-10

## 2024-06-19 ASSESSMENT — PAIN SCALES - GENERAL
PAINLEVEL_OUTOF10: 6
PAINLEVEL_OUTOF10: 0 - NO PAIN
PAINLEVEL_OUTOF10: 3
PAINLEVEL_OUTOF10: 6
PAINLEVEL_OUTOF10: 2

## 2024-06-19 NOTE — ANESTHESIA PREPROCEDURE EVALUATION
"Patient: Robi Rogers    Procedure Information       Date/Time: 06/18/24 2119    Procedure: Appendectomy Laparoscopy    Location: [facility] OR 04 / [facility]    Surgeons: Uriel Vargas MD          Vitals:    06/18/24 2040   BP: 122/78   Pulse: 65   Resp: 16   Temp:    SpO2: 99%       Past Surgical History:   Procedure Laterality Date    HERNIA REPAIR      OTHER SURGICAL HISTORY  12/17/2019    Hernia repair     History reviewed. No pertinent past medical history.  No current facility-administered medications for this encounter.  Prior to Admission medications    Medication Sig Start Date End Date Taking? Authorizing Provider   omeprazole (PriLOSEC) 20 mg DR capsule Take 1 capsule (20 mg) by mouth. Do not crush or chew.   Yes Historical Provider, MD   zolpidem (Ambien) 5 mg tablet Take 1 tablet (5 mg) by mouth as needed at bedtime for sleep.  Patient not taking: Reported on 6/18/2024 4/1/24 5/1/24  Martínez Medrano MD     Allergies   Allergen Reactions    Bee Venom Protein (Honey Bee) Anaphylaxis     Social History     Tobacco Use    Smoking status: Former     Current packs/day: 0.00     Types: Cigarettes     Quit date: 2021     Years since quitting: 3.4    Smokeless tobacco: Never   Substance Use Topics    Alcohol use: Yes     Comment: 2-3 beers daily         Chemistry    Lab Results   Component Value Date/Time     06/18/2024 1426    K 3.7 06/18/2024 1426     06/18/2024 1426    CO2 25 06/18/2024 1426    BUN 14 06/18/2024 1426    CREATININE 0.92 06/18/2024 1426    Lab Results   Component Value Date/Time    CALCIUM 9.5 06/18/2024 1426    ALKPHOS 83 06/18/2024 1426    AST 25 06/18/2024 1426    ALT 42 06/18/2024 1426    BILITOT 0.5 06/18/2024 1426          Lab Results   Component Value Date/Time    WBC 6.7 06/18/2024 1426    HGB 15.7 06/18/2024 1426    HCT 46.2 06/18/2024 1426     06/18/2024 1426     No results found for: \"PROTIME\", \"PTT\", \"INR\"  No results found for this or any previous visit " (from the past 4464 hour(s)).     Relevant Problems   Cardiac   (+) Chest pain      Neuro   (+) Lumbar radiculopathy   (+) Sciatica      GI   (+) GERD (gastroesophageal reflux disease)      ID   (+) URI with cough and congestion       Clinical information reviewed:   Tobacco  Allergies  Meds  Problems  Med Hx  Surg Hx   Fam Hx  Soc   Hx        NPO Detail:  No data recorded     Physical Exam    Airway  Mallampati: II  TM distance: >3 FB     Cardiovascular - normal exam  Rhythm: regular  Rate: normal     Dental - normal exam     Pulmonary - normal exam     Abdominal - normal exam  Abdomen: soft             Anesthesia Plan    History of general anesthesia?: yes  History of complications of general anesthesia?: no    ASA 2 - emergent     general     The patient is not a current smoker.  Patient was previously instructed to abstain from smoking on day of procedure.  Patient did not smoke on day of procedure.  Education provided regarding risk of obstructive sleep apnea.  intravenous induction

## 2024-06-19 NOTE — ANESTHESIA PROCEDURE NOTES
Airway  Date/Time: 6/18/2024 9:26 PM  Urgency: elective    Airway not difficult    Staffing  Performed: CRNA   Authorized by: Rolly Maradiaga DO    Performed by: NAYELY Clayton-PETRA  Patient location during procedure: OR    Indications and Patient Condition  Indications for airway management: anesthesia  Spontaneous Ventilation: absent  Sedation level: deep  Preoxygenated: yes  Patient position: sniffing  Mask difficulty assessment: 0 - not attempted    Final Airway Details  Final airway type: endotracheal airway      Successful airway: ETT  Cuffed: yes   Successful intubation technique: video laryngoscopy ([place])  Facilitating devices/methods: intubating stylet  Blade: Herb  Blade size: #4  ETT size (mm): 7.5  Cormack-Lehane Classification: grade I - full view of glottis  Placement verified by: chest auscultation, capnometry and palpation of cuff   Cuff volume (mL): 6  Measured from: lips  ETT to lips (cm): 23  Number of attempts at approach: 1

## 2024-06-19 NOTE — BRIEF OP NOTE
Date: 2024  OR Location: [facility] OR    Name: Robi Rogers, : 1985, Age: 38 y.o., MRN: [id number], Sex: male    Diagnosis  Pre-op Diagnosis     * Acute appendicitis, unspecified acute appendicitis type [K35.80] Post-op Diagnosis     * Acute appendicitis, unspecified acute appendicitis type [K35.80]     Procedures  Appendectomy Laparoscopy  85338 - MS LAPAROSCOPIC APPENDECTOMY      Surgeons      * Uriel Vargas - Primary    Resident/Fellow/Other Assistant:  Surgeons and Role:  * No surgeons found with a matching role *    Procedure Summary  Anesthesia: General  ASA: II  Anesthesia Staff: Anesthesiologist: Rolly Maradiaga DO  CRNA: NAYELY Clayton-CRNA  Estimated Blood Loss: 10mL  Intra-op Medications:   Administrations occurring from  to  on 24:   Medication Name Total Dose   sodium chloride 0.9 % irrigation solution 1,000 mL              Anesthesia Record               Intraprocedure I/O Totals          Output    Est. Blood Loss 10 mL    Total Output 10 mL          Specimen:   ID Type Source Tests Collected by Time   1 : APPENDIX Tissue APPENDIX SURGICAL PATHOLOGY EXAM Uriel Vargas MD 2024        Staff:   Circulator: Rocio  Scrub Person: Queta  Scrub Person: Jovon          Findings: Acutely inflamed appendix without perforation or abscess.    Complications:  None; patient tolerated the procedure well.     Disposition: PACU - hemodynamically stable.  Condition: stable  Specimens Collected:   ID Type Source Tests Collected by Time   1 : APPENDIX Tissue APPENDIX SURGICAL PATHOLOGY EXAM Uriel Vargas MD 2024     Attending Attestation: I was present and scrubbed for the entire procedure.    Uriel Vargas  Phone Number: [phone]

## 2024-06-19 NOTE — OP NOTE
Appendectomy Laparoscopy Operative Note     Date: 2024  OR Location: [facility] OR    Name: Robi Rogers, : 1985, Age: 38 y.o., MRN: [id number], Sex: male    Diagnosis  Pre-op Diagnosis     * Acute appendicitis, unspecified acute appendicitis type [K35.80] Post-op Diagnosis     * Acute appendicitis, unspecified acute appendicitis type [K35.80]     Procedures  Appendectomy Laparoscopy  59077 - [place] LAPAROSCOPIC APPENDECTOMY      Surgeons      * Uriel Vargas - Primary    Resident/Fellow/Other Assistant:  Surgeons and Role:  * No surgeons found with a matching role *    Procedure Summary  Anesthesia: General  ASA: II  Anesthesia Staff: Anesthesiologist: Rolly Maradiaga DO  CRNA: NAYELY Clayton-CRNA  Estimated Blood Loss: 10mL  Intra-op Medications:   Administrations occurring from  to  on 24:   Medication Name Total Dose   sodium chloride 0.9 % irrigation solution 1,000 mL              Anesthesia Record               Intraprocedure I/O Totals          Output    Est. Blood Loss 10 mL    Total Output 10 mL          Specimen:   ID Type Source Tests Collected by Time   1 : APPENDIX Tissue APPENDIX SURGICAL PATHOLOGY EXAM Uriel Vargas MD 2024 [id number]        Staff:   Circulator: Rocio Pappas Person: Queta Dunnub Person: Jovon         Drains and/or Catheters: * None in log *    Tourniquet Times:         Implants:     Findings: Acutely inflamed appendix without abscess or perforation.    Indications: Robi Rogers is an 38 y.o. male who is having surgery for Acute appendicitis, unspecified acute appendicitis type [K35.80].     The patient was seen in the preoperative area. The risks, benefits, complications, treatment options, non-operative alternatives, expected recovery and outcomes were discussed with the patient. The possibilities of reaction to medication, pulmonary aspiration, injury to surrounding structures, bleeding, recurrent infection, the need for additional procedures,  failure to diagnose a condition, and creating a complication requiring transfusion or operation were discussed with the patient. The patient concurred with the proposed plan, giving informed consent.  The site of surgery was properly noted/marked if necessary per policy. The patient has been actively warmed in preoperative area. Preoperative antibiotics have been ordered and given within 2 hours of incision. Venous thrombosis prophylaxis have been ordered including bilateral sequential compression devices    Procedure Details: Safety checklist was performed in the preoperative area confirming correct patient and correct procedure.  The patient was brought to the operating room.  Sequential compression devices were applied to bilateral lower extremities.  Following induction of general anesthesia the patient was placed in supine position with left arm tucked and right arm abducted and gently fixed to arm board.  Careful attention was paid to appropriate padding of pressure points.  The hair of the abdomen was clipped with an atraumatic hair clipper.  A 16Fr del valle catheter was inserted into the bladder using sterile technique.  The abdominal wall was prepped with chloraprep and the patient draped in the usual sterile fashion.  A time out was performed, once again confirming correct patient and correct procedure.  The patient had received zosyn antibiotic in ED shortly prior to arrival to OR and did not require redosing.  The sterile skin marker was used to delineate a small infra-umbilical midline incision.  The planned incision was anesthetized with 0.5% marcaine.  Skin incision was created with the #15 scalpel blade.  Using a combination of electrocautery and blunt dissection, the incisional opening was carried to the level of the fascia which was grasped and sharply divided between Kocher clamps using the #15 scalpel blade.  A Sandra forceps clamp was gently inserted into the fascial opening to confirm entry into  the abdomen.  The 12mm trochar was inserted into the abdomen and carbon dioxide pneumoperitoneum established.  A 30 degree 5mm laparoscope was inserted into the abdomen.  The viscera below the entry site was examined and found to be undisturbed.  The patient was then placed in trendelenberg and left side tilted down position.  Two 5mm laparoscopic ports were inserted under direct laparoscopic vision with one in the left lower quadrant and the second in the supra-pubic midline region.  The small bowel in the right lower quadrant was reflected superomedially and the appendix was identified.  It was inflamed but non-perforated.  There was no surrounding purulence.  The appendix was grasped and retracted anteriorly.  The mesoappendix was divided using laparoscopic ligasure.  The base of the appendix was cleared to the cecum.  A 45mm blue load on the laparoscopic powered stapler was then brought across the base of the appendix, closed, and fired to divide the appendix.  The staple line was inspected and was both hemostatic and intact.  The divided appendix was then placed into a laparoscopic specimen bag.  The right lower quadrant was once again inspected and found to be hemostatic and without other findings.  The patient was brought out of trendelenberg and left side down position.  The 5mm ports were removed under direct vision and there was good hemostasis at the port entry sites.  The 12mm trochar along with appendix contained within the specimen bag were brought out through the umbilical fascial opening.  Carbon dioxide pneumoperitoneum was released.  A count of surgical instruments was performed and confirmed to be correct.  The umbilical fascial opening was closed with a figure of eight 0 vicryl stitch.  The subcutaneous tissue at the laparoscopic incisions was anesthetized with additional 0.5% marcaine.  The skin was then closed with buried 4-0 monocryl stitch.  The abdomen was cleansed with a moistened sponge  and dried.  Glue was applied over the incisions.  The bladder del valle catheter was removed at the end of the case.  A sign out was performed.  The patient was awakened and taken to the recovery area in stable condition.    Complications:  None; patient tolerated the procedure well.    Disposition: PACU - hemodynamically stable.  Condition: stable         Additional Details: N/A    Attending Attestation: I was present and scrubbed for the entire procedure.    Uriel Vargas  Phone Number: [phone]

## 2024-06-19 NOTE — H&P
History Of Present Illness  Robi Rogers is a 38 y.o. male presenting with acute onset abdominal pain.  Pain started yesterday a.m., 6/17/2024, at approximately 0400 hrs.  Pain awoke patient from sleep.  Pain primarily localized to right lower quadrant with radiation into left lower quadrant.  Pain described as a sharp pressure sensation, rated 10 out of 10.  Pain initially subsided yesterday the patient was able to go about his daily activities.  However, while patient was at work today, pain recurred and acutely exacerbated.  Pain associated with nausea but no emesis.  Pain also associated with subjective fevers, chills, sweats.  Patient has had some loose bowel movements today but otherwise denies hematochezia or melena.  Patient denies dysuria hematuria.  Prior to pain onset, patient was in usual state of health.  He does report that pain is aggravated by flexion at the waist, stretching, and pressure to his abdomen.    At time of presentation, patient noted to be afebrile with temperature of 36.5 °C.  Patient was noted to have a heart rate of 81.  He is hypertensive with initial blood pressure 166/104.  Respiratory rate 18.  Patient saturating 100% on room air.  CBC notable for no evidence of leukocytosis.  There is no anemia with hemoglobin and hematocrit of 15.7 and 46.2.  Platelet count is 271.  Comprehensive metabolic panel notable for mild elevation of glucose to 110.  Otherwise creatinine, liver function testing, and serum lipase are all within normal limits.  Urinalysis notable for trace blood but otherwise negative for nitrites and leukocyte esterase.  Further evaluation with CT of the abdomen and pelvis with IV contrast was performed with results listed below; mucosal hyperemia in the mild dilatation of the appendix compatible with acute appendicitis.    Surgery was consulted.  Patient was evaluated emergency department.  Last p.o. intake was approximately 12 hours ago.    Past medical history:  Gastroesophageal reflux disease.  Past surgical history: Knee repair as an infant.  Social history: Former smoker, current vapor, current daily alcohol use, positive for marijuana use otherwise denies other illicit substance use.  Family medical history: Negative for colorectal cancer or IBD.  Father with pancreatic cancer.  Cardiovascular disease on mother side of the family.     Past Medical History  History reviewed. No pertinent past medical history.    Surgical History  Past Surgical History:   Procedure Laterality Date    HERNIA REPAIR      OTHER SURGICAL HISTORY  12/17/2019    Hernia repair        Social History  He reports that he quit smoking about 3 years ago. His smoking use included cigarettes. He has never used smokeless tobacco. He reports current alcohol use. He reports current drug use. Drug: Marijuana.    Family History  Family History   Problem Relation Name Age of Onset    Cancer Father      Diabetes Father          Allergies  Bee venom protein (honey bee)    Review of Systems   Constitutional:  Positive for chills, diaphoresis and fever. Negative for activity change, appetite change, fatigue and unexpected weight change.   Respiratory:  Negative for cough, choking, chest tightness, shortness of breath and wheezing.    Cardiovascular:  Negative for chest pain, palpitations and leg swelling.   Gastrointestinal:  Positive for abdominal pain, diarrhea and nausea. Negative for abdominal distention, anal bleeding, blood in stool, constipation, rectal pain and vomiting.   Genitourinary:  Negative for difficulty urinating, dysuria, frequency and hematuria.   Neurological:  Negative for dizziness, weakness and light-headedness.   Psychiatric/Behavioral:  Negative for agitation.         Physical Exam  Constitutional:       General: He is not in acute distress.     Appearance: Normal appearance. He is not ill-appearing.   HENT:      Head: Normocephalic.      Mouth/Throat:      Mouth: Mucous membranes are  "moist.   Eyes:      Extraocular Movements: Extraocular movements intact.      Pupils: Pupils are equal, round, and reactive to light.   Cardiovascular:      Rate and Rhythm: Normal rate and regular rhythm.      Pulses: Normal pulses.      Heart sounds: Normal heart sounds. No murmur heard.  Pulmonary:      Effort: No respiratory distress.      Breath sounds: No wheezing, rhonchi or rales.   Chest:      Chest wall: No tenderness.   Abdominal:      General: There is distension.      Palpations: There is no mass.      Tenderness: There is abdominal tenderness. There is no guarding or rebound.      Hernia: No hernia is present.      Comments: Primarily TTP in RLQ.   Musculoskeletal:         General: No swelling or deformity.      Cervical back: No rigidity.      Right lower leg: No edema.      Left lower leg: No edema.   Skin:     General: Skin is warm.      Coloration: Skin is not jaundiced or pale.   Neurological:      Mental Status: He is alert.          Last Recorded Vitals  Blood pressure (!) 137/97, pulse 60, temperature 36.5 °C (97.7 °F), temperature source Temporal, resp. rate 14, height 1.854 m (6' 1\"), weight 81.6 kg (180 lb), SpO2 99%.    Relevant Results  Results for orders placed or performed during the hospital encounter of 06/18/24 (from the past 24 hour(s))   CBC and Auto Differential   Result Value Ref Range    WBC 6.7 4.4 - 11.3 x10*3/uL    nRBC 0.0 0.0 - 0.0 /100 WBCs    RBC 5.29 4.50 - 5.90 x10*6/uL    Hemoglobin 15.7 13.5 - 17.5 g/dL    Hematocrit 46.2 41.0 - 52.0 %    MCV 87 80 - 100 fL    MCH 29.7 26.0 - 34.0 pg    MCHC 34.0 32.0 - 36.0 g/dL    RDW 12.9 11.5 - 14.5 %    Platelets 271 150 - 450 x10*3/uL    Neutrophils % 57.8 40.0 - 80.0 %    Immature Granulocytes %, Automated 0.4 0.0 - 0.9 %    Lymphocytes % 32.3 13.0 - 44.0 %    Monocytes % 6.1 2.0 - 10.0 %    Eosinophils % 2.7 0.0 - 6.0 %    Basophils % 0.7 0.0 - 2.0 %    Neutrophils Absolute 3.89 1.20 - 7.70 x10*3/uL    Immature Granulocytes " Absolute, Automated 0.03 0.00 - 0.70 x10*3/uL    Lymphocytes Absolute 2.18 1.20 - 4.80 x10*3/uL    Monocytes Absolute 0.41 0.10 - 1.00 x10*3/uL    Eosinophils Absolute 0.18 0.00 - 0.70 x10*3/uL    Basophils Absolute 0.05 0.00 - 0.10 x10*3/uL   Comprehensive metabolic panel   Result Value Ref Range    Glucose 110 (H) 74 - 99 mg/dL    Sodium 139 136 - 145 mmol/L    Potassium 3.7 3.5 - 5.3 mmol/L    Chloride 106 98 - 107 mmol/L    Bicarbonate 25 21 - 32 mmol/L    Anion Gap 12 10 - 20 mmol/L    Urea Nitrogen 14 6 - 23 mg/dL    Creatinine 0.92 0.50 - 1.30 mg/dL    eGFR >90 >60 mL/min/1.73m*2    Calcium 9.5 8.6 - 10.3 mg/dL    Albumin 4.7 3.4 - 5.0 g/dL    Alkaline Phosphatase 83 33 - 120 U/L    Total Protein 7.6 6.4 - 8.2 g/dL    AST 25 9 - 39 U/L    Bilirubin, Total 0.5 0.0 - 1.2 mg/dL    ALT 42 10 - 52 U/L   Lipase   Result Value Ref Range    Lipase 21 9 - 82 U/L   Urinalysis with Reflex Microscopic   Result Value Ref Range    Color, Urine Light-Yellow Light-Yellow, Yellow, Dark-Yellow    Appearance, Urine Clear Clear    Specific Gravity, Urine 1.027 1.005 - 1.035    pH, Urine 5.5 5.0, 5.5, 6.0, 6.5, 7.0, 7.5, 8.0    Protein, Urine NEGATIVE NEGATIVE, 10 (TRACE), 20 (TRACE) mg/dL    Glucose, Urine Normal Normal mg/dL    Blood, Urine 0.03 (TRACE) (A) NEGATIVE    Ketones, Urine NEGATIVE NEGATIVE mg/dL    Bilirubin, Urine NEGATIVE NEGATIVE    Urobilinogen, Urine Normal Normal mg/dL    Nitrite, Urine NEGATIVE NEGATIVE    Leukocyte Esterase, Urine NEGATIVE NEGATIVE   Microscopic Only, Urine   Result Value Ref Range    WBC, Urine NONE 1-5, NONE /HPF    RBC, Urine 1-2 NONE, 1-2, 3-5 /HPF     STUDY:  CT ABDOMEN PELVIS W IV CONTRAST;  6/18/2024 6:30 pm      INDICATION:  Signs/Symptoms:right flank and RLQ pain, r/o appy vs stone.      COMPARISON:  9/21/2023      ACCESSION NUMBER(S):  [id number]      ORDERING CLINICIAN:  BRENDA ALANIZ      TECHNIQUE:  Contiguous axial images of the abdomen and pelvis were obtained  after  the intravenous administration of  contrast. Coronal and sagittal  reformatted images were obtained from the axial images.      FINDINGS:  Mild basilar subsegmental atelectasis.  No pleural effusion.      There is hepatic steatosis. 12 mm hepatic cyst. The gallbladder is  partially contracted and not well evaluated.      The pancreas, spleen, and adrenal glands appear unremarkable.      Symmetric enhancement of the kidneys.  No hydronephrosis.      No evidence of bowel obstruction. There is mucosal hyperemia and mild  dilatation of the appendix measuring 9 mm in diameter. Mildly  prominent lymph nodes right lower quadrant measuring up to 8 mm.  There is colonic diverticulosis without evidence of acute  diverticulitis.      Urinary bladder is underdistended and not well evaluated.      No acute fracture of the lumbar spine.      IMPRESSION:  Mucosal hyperemia and mild dilatation of the appendix compatible with  acute appendicitis.      Mildly prominent right lower quadrant lymph nodes may be reactive in  nature.      MACRO:  None      Signed by: Jeffrey Arredondo 6/18/2024 7:29 PM  Dictation workstation:   [id number]     Assessment/Plan   Principal Problem:    Acute appendicitis  -  Continue NPO  -  IVF  -  IV abx; currently receiving zosyn in ED  -  For laparoscopic possible open appendectomy  -  R/B/A of surgery discussed with patient  -  Questions answered  -  Patient consents to proceed  -  Admit to surgery         I spent 60 minutes in the professional and overall care of this patient.      Uriel Vargas MD

## 2024-06-19 NOTE — ANESTHESIA POSTPROCEDURE EVALUATION
Patient: Robi Rogers    Procedure Summary       Date: 06/18/24 Room / Location: [facility] OR 04 / Virtual [facility] OR    Anesthesia Start: 2118 Anesthesia Stop: 2225    Procedure: Appendectomy Laparoscopy Diagnosis:       Acute appendicitis, unspecified acute appendicitis type      (Acute appendicitis, unspecified acute appendicitis type [K35.80])    Surgeons: Uriel Vargas MD Responsible Provider: Rolly Maradiaga DO    Anesthesia Type: general ASA Status: 2 - Emergent            Anesthesia Type: general    Vitals Value Taken Time   /89 06/18/24 2221   Temp 36.0 06/18/24 2226   Pulse 79 06/18/24 2226   Resp 26 06/18/24 2224   SpO2 97 % 06/18/24 2224   Vitals shown include unfiled device data.    Anesthesia Post Evaluation    Patient location during evaluation: PACU  Patient participation: complete - patient participated  Level of consciousness: awake  Pain score: 0  Pain management: adequate  Airway patency: patent  Cardiovascular status: acceptable  Respiratory status: acceptable, nonlabored ventilation, room air, unassisted and spontaneous ventilation  Hydration status: acceptable  Postoperative Nausea and Vomiting: none        There were no known notable events for this encounter.

## 2024-06-19 NOTE — NURSING NOTE
2105: Pt to the floor at this time from the ED.     2115: Pt leaving the floor for the OR at this time.

## 2024-06-19 NOTE — DISCHARGE SUMMARY
Discharge Diagnosis  #Acute appendicitis  #Gastroesophageal reflux disease    Issues Requiring Follow-Up  Post-operative follow-up    Test Results Pending At Discharge  Pending Labs       Order Current Status    Surgical Pathology Exam In process            Hospital Course  Patient presented to [facility] emergency department 6/18/2024 for evaluation of acute onset abdominal pain.  Evaluation in the emergency department was notable for CT evidence of acute appendicitis.  Surgery was consulted.  Patient was evaluated and recommended laparoscopic possible open appendectomy.  Patient was taken to the operating room and successfully underwent laparoscopic appendectomy on day of presentation.  Postoperatively, patient was taken to the regular nursing floor.  Patient was initiated on clear liquid diet, low rate of intravenous fluids, multimodal pain management regimen, and DVT chemoprophylaxis.  Patient was resumed on PPI therapy for underlying history of gastroesophageal reflux disease.  Patient was continued on intravenous Zosyn antibiotic.  On postoperative day 1, patient's intravenous fluids were discontinued.  Diet was advanced to a regular diet.  Patient had achieved adequate pain control, tolerated diet advancement, had return of bowel function, and was able to void and ambulate independently.  Patient was deemed appropriate for discharge and thus discharged to home with instructions for wound care, pain control, diet, activity, and follow-up.      Pertinent Physical Exam At Time of Discharge  Physical Exam  See progress note from day of discharge    Home Medications     Medication List      START taking these medications     amoxicillin-pot clavulanate 875-125 mg tablet; Commonly known as:   Augmentin; Take 1 tablet (875 mg) by mouth 2 times a day for 7 days.   oxyCODONE-acetaminophen 5-325 mg tablet; Commonly known as: Percocet;   Take 1 tablet by mouth every 6 hours if needed for severe pain (7 -  10)   for up to 2 days.     CONTINUE taking these medications     omeprazole 20 mg DR capsule; Commonly known as: PriLOSEC   zolpidem 5 mg tablet; Commonly known as: Ambien; Take 1 tablet (5 mg) by   mouth as needed at bedtime for sleep.       Outpatient Follow-Up  Future Appointments   Date Time Provider Department Center   7/2/2024  8:40 AM Uriel Vargas MD [id number] [place]       Uriel Vargas MD

## 2024-06-19 NOTE — ANESTHESIA POSTPROCEDURE EVALUATION
Patient: Robi Rogers    Procedure Summary       Date: 06/18/24 Room / Location: [facility] OR 04 / [facility]    Anesthesia Start: 2118 Anesthesia Stop: 2224    Procedure: Appendectomy Laparoscopy Diagnosis:       Acute appendicitis, unspecified acute appendicitis type      (Acute appendicitis, unspecified acute appendicitis type [K35.80])    Surgeons: Uriel Vargas MD Responsible Provider: Rolly Maradiaga DO    Anesthesia Type: general ASA Status: 2 - Emergent            Anesthesia Type: general    Vitals Value Taken Time   /82 06/18/24 2224   Temp 36 06/18/24 2224   Pulse 81 06/18/24 2224   Resp 16 06/18/24 2224   SpO2 99 06/18/24 2224       Anesthesia Post Evaluation    Patient location during evaluation: PACU  Patient participation: complete - patient participated  Level of consciousness: awake  Pain score: 0  Pain management: adequate  Multimodal analgesia pain management approach  Airway patency: patent  Two or more strategies used to mitigate risk of obstructive sleep apnea  Cardiovascular status: acceptable  Respiratory status: acceptable  Hydration status: acceptable  Postoperative Nausea and Vomiting: none        There were no known notable events for this encounter.

## 2024-06-19 NOTE — DISCHARGE INSTRUCTIONS
WOUND CARE:  OK to shower 24 hours after surgery.  No tub bath/soaking/swimming until cleared at outpatient appointment.  Do not remove any staples or stitches; if these are present they will be removed at outpatient appointment.  Any glue will peel away on its own.    PAIN CONTROL:  Can utilize prescribed percocet as needed.  Do not combine percocet with acetaminophen (tylenol) to avoid exceeding safe daily recommended intake of acetaminophen.  Can utilize ibuprofen as needed.    ACTIVITY:  No heavy lifting (>10-15lbs) or strenuous activity for 4-6 weeks.  OK to walk and take stairs at slow pace.  Do not drive or operative heavy machinery for at least first 24 hours after surgery; if you do not feel able to safely operate after first 24 hours or are taking narcotics (ie percocet or vicodin) then do not operative heavy machinery or drive.    DIET:  Resume previous diet.    FOLLOW-UP:   Please call office at [phone] to schedule follow-up appointment for approximately two weeks from date of surgery.

## 2024-06-19 NOTE — CARE PLAN
Problem: Pain - Adult  Goal: Verbalizes/displays adequate comfort level or baseline comfort level  Outcome: Progressing     Problem: Safety - Adult  Goal: Free from fall injury  Outcome: Progressing     Problem: Discharge Planning  Goal: Discharge to home or other facility with appropriate resources  Outcome: Progressing     Problem: Chronic Conditions and Co-morbidities  Goal: Patient's chronic conditions and co-morbidity symptoms are monitored and maintained or improved  Outcome: Progressing   The patient's goals for the shift include      The clinical goals for the shift include Pt will receive adequate rest.    Over the shift, the patient did receive rest.

## 2024-06-19 NOTE — PROGRESS NOTES
"Robi Rogers is a 38 y.o. male on day 0 of admission presenting with Acute appendicitis.    Subjective   Afebrile O/N.  No acute events O/N.  Pain improved.  Denies nausea or emesis.  Tolerating CLD.  Passed flatus.  Some pain with urination O/N.         Objective     Physical Exam  Constitutional:       General: He is not in acute distress.     Appearance: Normal appearance. He is not ill-appearing.   HENT:      Head: Normocephalic.      Mouth/Throat:      Mouth: Mucous membranes are moist.   Eyes:      Extraocular Movements: Extraocular movements intact.   Pulmonary:      Effort: Pulmonary effort is normal. No respiratory distress.   Abdominal:      General: There is no distension.      Palpations: Abdomen is soft. There is no mass.      Tenderness: There is abdominal tenderness. There is no guarding or rebound.      Hernia: No hernia is present.      Comments: Appropriately TTP.  Incisions C/D/I w/ glue.     Musculoskeletal:      Cervical back: No rigidity.      Right lower leg: No edema.      Left lower leg: No edema.   Skin:     General: Skin is warm.      Coloration: Skin is not jaundiced or pale.   Neurological:      General: No focal deficit present.      Mental Status: He is alert and oriented to person, place, and time.         Last Recorded Vitals  Blood pressure 142/86, pulse 66, temperature 35.9 °C (96.6 °F), temperature source Temporal, resp. rate 18, height 1.854 m (6' 1\"), weight 81.6 kg (180 lb), SpO2 97%.  Intake/Output last 3 Shifts:  I/O last 3 completed shifts:  In: 600 (7.3 mL/kg) [I.V.:600 (7.3 mL/kg)]  Out: 385 (4.7 mL/kg) [Urine:375 (0.1 mL/kg/hr); Blood:10]  Weight: 81.6 kg     Relevant Results  Results for orders placed or performed during the hospital encounter of 06/18/24 (from the past 24 hour(s))   CBC and Auto Differential   Result Value Ref Range    WBC 6.7 4.4 - 11.3 x10*3/uL    nRBC 0.0 0.0 - 0.0 /100 WBCs    RBC 5.29 4.50 - 5.90 x10*6/uL    Hemoglobin 15.7 13.5 - 17.5 g/dL    " Hematocrit 46.2 41.0 - 52.0 %    MCV 87 80 - 100 fL    MCH 29.7 26.0 - 34.0 pg    MCHC 34.0 32.0 - 36.0 g/dL    RDW 12.9 11.5 - 14.5 %    Platelets 271 150 - 450 x10*3/uL    Neutrophils % 57.8 40.0 - 80.0 %    Immature Granulocytes %, Automated 0.4 0.0 - 0.9 %    Lymphocytes % 32.3 13.0 - 44.0 %    Monocytes % 6.1 2.0 - 10.0 %    Eosinophils % 2.7 0.0 - 6.0 %    Basophils % 0.7 0.0 - 2.0 %    Neutrophils Absolute 3.89 1.20 - 7.70 x10*3/uL    Immature Granulocytes Absolute, Automated 0.03 0.00 - 0.70 x10*3/uL    Lymphocytes Absolute 2.18 1.20 - 4.80 x10*3/uL    Monocytes Absolute 0.41 0.10 - 1.00 x10*3/uL    Eosinophils Absolute 0.18 0.00 - 0.70 x10*3/uL    Basophils Absolute 0.05 0.00 - 0.10 x10*3/uL   Comprehensive metabolic panel   Result Value Ref Range    Glucose 110 (H) 74 - 99 mg/dL    Sodium 139 136 - 145 mmol/L    Potassium 3.7 3.5 - 5.3 mmol/L    Chloride 106 98 - 107 mmol/L    Bicarbonate 25 21 - 32 mmol/L    Anion Gap 12 10 - 20 mmol/L    Urea Nitrogen 14 6 - 23 mg/dL    Creatinine 0.92 0.50 - 1.30 mg/dL    eGFR >90 >60 mL/min/1.73m*2    Calcium 9.5 8.6 - 10.3 mg/dL    Albumin 4.7 3.4 - 5.0 g/dL    Alkaline Phosphatase 83 33 - 120 U/L    Total Protein 7.6 6.4 - 8.2 g/dL    AST 25 9 - 39 U/L    Bilirubin, Total 0.5 0.0 - 1.2 mg/dL    ALT 42 10 - 52 U/L   Lipase   Result Value Ref Range    Lipase 21 9 - 82 U/L   Urinalysis with Reflex Microscopic   Result Value Ref Range    Color, Urine Light-Yellow Light-Yellow, Yellow, Dark-Yellow    Appearance, Urine Clear Clear    Specific Gravity, Urine 1.027 1.005 - 1.035    pH, Urine 5.5 5.0, 5.5, 6.0, 6.5, 7.0, 7.5, 8.0    Protein, Urine NEGATIVE NEGATIVE, 10 (TRACE), 20 (TRACE) mg/dL    Glucose, Urine Normal Normal mg/dL    Blood, Urine 0.03 (TRACE) (A) NEGATIVE    Ketones, Urine NEGATIVE NEGATIVE mg/dL    Bilirubin, Urine NEGATIVE NEGATIVE    Urobilinogen, Urine Normal Normal mg/dL    Nitrite, Urine NEGATIVE NEGATIVE    Leukocyte Esterase, Urine NEGATIVE NEGATIVE    Microscopic Only, Urine   Result Value Ref Range    WBC, Urine NONE 1-5, NONE /HPF    RBC, Urine 1-2 NONE, 1-2, 3-5 /HPF   CBC   Result Value Ref Range    WBC 8.4 4.4 - 11.3 x10*3/uL    nRBC 0.0 0.0 - 0.0 /100 WBCs    RBC 4.97 4.50 - 5.90 x10*6/uL    Hemoglobin 14.7 13.5 - 17.5 g/dL    Hematocrit 43.8 41.0 - 52.0 %    MCV 88 80 - 100 fL    MCH 29.6 26.0 - 34.0 pg    MCHC 33.6 32.0 - 36.0 g/dL    RDW 13.0 11.5 - 14.5 %    Platelets 249 150 - 450 x10*3/uL   Basic metabolic panel   Result Value Ref Range    Glucose 129 (H) 74 - 99 mg/dL    Sodium 137 136 - 145 mmol/L    Potassium 3.9 3.5 - 5.3 mmol/L    Chloride 104 98 - 107 mmol/L    Bicarbonate 23 21 - 32 mmol/L    Anion Gap 14 10 - 20 mmol/L    Urea Nitrogen 11 6 - 23 mg/dL    Creatinine 0.90 0.50 - 1.30 mg/dL    eGFR >90 >60 mL/min/1.73m*2    Calcium 9.0 8.6 - 10.3 mg/dL                Assessment/Plan   POD#1 S/P laparoscopic appendectomy    #Acute appendicitis  -  S/P above surgery  -  Advance to regular diet  -  DC IVF  -  Multi-modal pain management regimen  -  DVT chemoprophylaxis  -  Zosyn  -  Protonix for PPI  -  DC to home today; instructions reviewed with patient and placed into chart    Uriel Vargas MD   6/19/2024  9:52 AM          I spent 15 minutes in the professional and overall care of this patient.      Uriel Vargas MD

## 2024-06-27 NOTE — PROGRESS NOTES
LUCÍA Rogers is a 38 y.o. male who presented to [facility] ED on 6/17/24 with RLQ abdominal pain that radiated to the LLQ. He reported the pain woke him up from his sleep in the early morning, then subsided. While at work the pain reoccurred and leading him to the ED. Imaging was obtained and showed mucosal hyperemia in the mild dilatation of the appendix compatible with acute appendicitis.     He was taken to the OR that same day for a laparoscopic appendectomy. Path pending. He tolerated the procedure well and was discharged on 6/19/24 with Augmentin x7 days. He presents today for follow up.     He is doing well overall. No abdominal pain.  He is eating and drinking normally. Denies nausea or vomiting. Denies fevers, chills, sweats. Denies hematochezia or melena.     CT a/p 6/18/24: - Mucosal hyperemia and mild dilatation of the appendix compatible with acute appendicitis.  - Mildly prominent right lower quadrant lymph nodes may be reactive in nature.    Colonoscopy: N/A    Former smoker/ETOH use/Marijuana use  PMH: GERD,   PSH: Knee repair in Infancy   No family history of CRC or IBD      No past medical history on file.    Past Surgical History:   Procedure Laterality Date    HERNIA REPAIR      OTHER SURGICAL HISTORY  12/17/2019    Hernia repair       Allergies   Allergen Reactions    Bee Venom Protein (Honey Bee) Anaphylaxis       Review of Systems   Constitutional:  Negative for activity change, appetite change, chills, diaphoresis, fatigue, fever and unexpected weight change.   Respiratory:  Negative for cough, chest tightness and shortness of breath.    Cardiovascular:  Negative for chest pain, palpitations and leg swelling.   Gastrointestinal:  Negative for abdominal pain, anal bleeding, blood in stool, constipation, diarrhea, nausea, rectal pain and vomiting.   Genitourinary:  Negative for difficulty urinating, dysuria and hematuria.   All other systems reviewed and are negative.      Current  Outpatient Medications on File Prior to Visit   Medication Sig Dispense Refill    [] amoxicillin-pot clavulanate (Augmentin) 875-125 mg tablet Take 1 tablet (875 mg) by mouth 2 times a day for 7 days. 14 tablet 0    omeprazole (PriLOSEC) 20 mg DR capsule Take 1 capsule (20 mg) by mouth. Do not crush or chew.      [] oxyCODONE-acetaminophen (Percocet) 5-325 mg tablet Take 1 tablet by mouth every 6 hours if needed for severe pain (7 - 10) for up to 2 days. 6 tablet 0    zolpidem (Ambien) 5 mg tablet Take 1 tablet (5 mg) by mouth as needed at bedtime for sleep. (Patient not taking: Reported on 2024) 30 tablet 0     No current facility-administered medications on file prior to visit.       Physical Exam  Constitutional:       Appearance: Normal appearance.   HENT:      Head: Atraumatic.      Nose: Nose normal.      Mouth/Throat:      Mouth: Mucous membranes are moist.   Eyes:      Extraocular Movements: Extraocular movements intact.   Pulmonary:      Effort: Pulmonary effort is normal. No respiratory distress.   Abdominal:      General: There is no distension.      Palpations: Abdomen is soft. There is no mass.      Tenderness: There is no abdominal tenderness. There is no guarding or rebound.      Hernia: No hernia is present.   Musculoskeletal:         General: No swelling. Normal range of motion.      Cervical back: Normal range of motion.      Right lower leg: No edema.      Left lower leg: No edema.   Skin:     General: Skin is warm and dry.      Coloration: Skin is not jaundiced.   Neurological:      General: No focal deficit present.      Mental Status: He is alert and oriented to person, place, and time.   Psychiatric:         Mood and Affect: Mood normal.         Behavior: Behavior normal.         Assessment and Plan:   #Acute appendicitis S/P laparoscopic appendectomy 2024  -  Expected post-operative course  -  Staples removed today in office  -  Pathology still pending; will call  patient once resulted  -  OK to start liberalizing diet  -  No strenuous activity or heavy lifting until 4 weeks post-op; work-note provided to patient today  -  Follow-up PRN    Uriel Vargas MD   7/2/2024  8:45 AM

## 2024-07-02 ENCOUNTER — APPOINTMENT (OUTPATIENT)
Dept: SURGERY | Facility: CLINIC | Age: 39
End: 2024-07-02
Payer: COMMERCIAL

## 2024-07-02 VITALS
HEIGHT: 73 IN | BODY MASS INDEX: 23.86 KG/M2 | WEIGHT: 180 LBS | SYSTOLIC BLOOD PRESSURE: 130 MMHG | DIASTOLIC BLOOD PRESSURE: 94 MMHG | HEART RATE: 74 BPM

## 2024-07-02 DIAGNOSIS — K35.80 ACUTE APPENDICITIS, UNSPECIFIED ACUTE APPENDICITIS TYPE: Primary | ICD-10-CM

## 2024-07-02 PROCEDURE — 99024 POSTOP FOLLOW-UP VISIT: CPT | Performed by: STUDENT IN AN ORGANIZED HEALTH CARE EDUCATION/TRAINING PROGRAM

## 2024-07-02 ASSESSMENT — ENCOUNTER SYMPTOMS
DIARRHEA: 0
SHORTNESS OF BREATH: 0
BLOOD IN STOOL: 0
ACTIVITY CHANGE: 0
ANAL BLEEDING: 0
PALPITATIONS: 0
CONSTIPATION: 0
HEMATURIA: 0
ABDOMINAL PAIN: 0
UNEXPECTED WEIGHT CHANGE: 0
VOMITING: 0
CHEST TIGHTNESS: 0
FEVER: 0
CHILLS: 0
DYSURIA: 0
DIAPHORESIS: 0
COUGH: 0
APPETITE CHANGE: 0
RECTAL PAIN: 0
NAUSEA: 0
DIFFICULTY URINATING: 0
FATIGUE: 0

## 2024-07-05 LAB
LABORATORY COMMENT REPORT: NORMAL
PATH REPORT.FINAL DX SPEC: NORMAL
PATH REPORT.GROSS SPEC: NORMAL
PATH REPORT.MICROSCOPIC SPEC OTHER STN: NORMAL
PATH REPORT.RELEVANT HX SPEC: NORMAL
PATH REPORT.TOTAL CANCER: NORMAL

## 2024-11-12 ENCOUNTER — HOSPITAL ENCOUNTER (EMERGENCY)
Facility: HOSPITAL | Age: 39
Discharge: HOME | End: 2024-11-12
Attending: EMERGENCY MEDICINE
Payer: COMMERCIAL

## 2024-11-12 VITALS
TEMPERATURE: 97.5 F | SYSTOLIC BLOOD PRESSURE: 136 MMHG | WEIGHT: 195 LBS | HEART RATE: 86 BPM | HEIGHT: 73 IN | BODY MASS INDEX: 25.84 KG/M2 | DIASTOLIC BLOOD PRESSURE: 91 MMHG | OXYGEN SATURATION: 98 % | RESPIRATION RATE: 18 BRPM

## 2024-11-12 DIAGNOSIS — S09.90XA INJURY OF HEAD, INITIAL ENCOUNTER: Primary | ICD-10-CM

## 2024-11-12 DIAGNOSIS — S01.01XA LACERATION OF SCALP, INITIAL ENCOUNTER: ICD-10-CM

## 2024-11-12 PROCEDURE — 12001 RPR S/N/AX/GEN/TRNK 2.5CM/<: CPT

## 2024-11-12 PROCEDURE — 99282 EMERGENCY DEPT VISIT SF MDM: CPT

## 2024-11-12 ASSESSMENT — PAIN - FUNCTIONAL ASSESSMENT
PAIN_FUNCTIONAL_ASSESSMENT: 0-10
PAIN_FUNCTIONAL_ASSESSMENT: 0-10

## 2024-11-12 ASSESSMENT — LIFESTYLE VARIABLES
EVER HAD A DRINK FIRST THING IN THE MORNING TO STEADY YOUR NERVES TO GET RID OF A HANGOVER: NO
HAVE YOU EVER FELT YOU SHOULD CUT DOWN ON YOUR DRINKING: NO
TOTAL SCORE: 0
HAVE PEOPLE ANNOYED YOU BY CRITICIZING YOUR DRINKING: NO
EVER FELT BAD OR GUILTY ABOUT YOUR DRINKING: NO

## 2024-11-12 ASSESSMENT — PAIN SCALES - GENERAL
PAINLEVEL_OUTOF10: 6
PAINLEVEL_OUTOF10: 4

## 2024-11-12 ASSESSMENT — PAIN DESCRIPTION - ORIENTATION: ORIENTATION: LEFT

## 2024-11-12 ASSESSMENT — COLUMBIA-SUICIDE SEVERITY RATING SCALE - C-SSRS
2. HAVE YOU ACTUALLY HAD ANY THOUGHTS OF KILLING YOURSELF?: NO
6. HAVE YOU EVER DONE ANYTHING, STARTED TO DO ANYTHING, OR PREPARED TO DO ANYTHING TO END YOUR LIFE?: NO
1. IN THE PAST MONTH, HAVE YOU WISHED YOU WERE DEAD OR WISHED YOU COULD GO TO SLEEP AND NOT WAKE UP?: NO

## 2024-11-12 ASSESSMENT — PAIN DESCRIPTION - PAIN TYPE
TYPE: ACUTE PAIN
TYPE: ACUTE PAIN

## 2024-11-12 ASSESSMENT — PAIN DESCRIPTION - LOCATION
LOCATION: HEAD
LOCATION: HEAD

## 2024-11-12 NOTE — ED NOTES
Pt was at work. He was bent down doing something then stood up and hit corner of fire extinguisher box has a laceration top head left side. Bleeding is controlled. -LOC -N/V     Latoya Lezama RN  11/12/24 4148

## 2024-11-12 NOTE — Clinical Note
Robi Rogers was seen and treated in our emergency department on 11/12/2024.  He may return to work on 11/12/2024.  Ok to return to work today with Full duty.     If you have any questions or concerns, please don't hesitate to call.      Carlo Elaine, DO

## 2024-11-12 NOTE — ED PROVIDER NOTES
HPI   Chief Complaint   Patient presents with   • Head Laceration     Pt presents to the ED due to hitting top left side of head on metal fire extinguisher box - approx. 2 inch laceration on head. Pt denies blood thinners, denies LOC. Bleeding is controlled.       This is a 39-year-old male presents the emergency room status post head injury at work.  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  When he lifted his head up, he hit his head on the corner of a fire extinguisher box.  He states he started feeling something on his head and it was bleeding.  They work as they are clean them off and put a bandage on it and he came to the emergency room to be evaluated because they said he had a laceration on top of his head.  He states has had no dizziness, no nausea, no blurred vision, but had does have a slight headache.  No neck pain.  His last tetanus was 1 year ago and he got that for work as well.  No other injuries at this time.              Patient History   History reviewed. No pertinent past medical history.  Past Surgical History:   Procedure Laterality Date   • HERNIA REPAIR     • OTHER SURGICAL HISTORY  12/17/2019    Hernia repair     Family History   Problem Relation Name Age of Onset   • Cancer Father     • Diabetes Father       Social History     Tobacco Use   • Smoking status: Former     Current packs/day: 0.00     Types: Cigarettes     Quit date: 2021     Years since quitting: 3.8   • Smokeless tobacco: Never   Vaping Use   • Vaping status: Every Day   • Substances: Nicotine   Substance Use Topics   • Alcohol use: Yes     Comment: 2-3 beers daily   • Drug use: Yes     Types: Marijuana       Physical Exam   ED Triage Vitals [11/12/24 1432]   Temperature Heart Rate Respirations BP   36.4 °C (97.5 °F) 90 18 (!) 158/103      Pulse Ox Temp Source Heart Rate Source Patient Position   96 % Temporal Monitor Sitting      BP Location FiO2 (%)     Right arm --        Physical Exam  Constitutional:       Appearance: Normal appearance. He is normal weight.   HENT:      Head: Normocephalic. Laceration present.        Comments: 1.25 cm superficial laceration with bleeding controlled on the left side of the head.  Cardiovascular:      Rate and Rhythm: Normal rate and regular rhythm.   Pulmonary:      Effort: Pulmonary effort is normal.   Musculoskeletal:      Cervical back: Full passive range of motion without pain, normal range of motion and neck supple.   Neurological:      Mental Status: He is alert.           ED Course & MDM   Diagnoses as of 11/12/24 1508   Injury of head, initial encounter   Laceration of scalp, initial encounter                 No data recorded     [place] Coma Scale Score: 15 (11/12/24 1434 : Jasmina Lopez RN)                           Medical Decision Making  There was cleaned with a combination of soap and water.  Bleeding was controlled.  Dermabond was placed.  Wound was checked after 6 minutes, no bleeding hemostatic's were achieved.    We discussed the possibly of concussion syndrome as the patient did have a mild headache.  He could develop dizziness, lightheadedness, altered vision, and sound sensitivity with nausea these are all signs of concussion syndrome however he develops severe headache, profuse vomiting 2 or more episodes, change in vision they should return to the emergency room for for a repeat evaluation and possible CT scan of the brain to rule out any delayed bleeding.    Patient has been released back to work TODAY 11/12/24 with full duty.      Procedure  Procedures     Carlo Elaine,   11/12/24 1507

## (undated) DEVICE — SUTURE, VICRYL, 3-0, 27 IN, SH

## (undated) DEVICE — TISSUE ADHESIVE, PREMIERPRO EXOFIN, PRECISION PEN HV, 1.0ML

## (undated) DEVICE — TROCAR, KII OPTICAL BLADELESS 5MM Z THREAD 100MM LNGTH

## (undated) DEVICE — SUTURE, MONOCRYL, 4-0, 27 IN, PS-2, UNDYED

## (undated) DEVICE — TROCAR, OPTICAL, BLADELESS, 12MM, THREADED, 100MM LENGTH

## (undated) DEVICE — STAPLER,  ENDO ECHELON 45MM RELOAD, BLUE

## (undated) DEVICE — SUTURE, VICRYL, 0, 27 IN, UR-6, VIOLET

## (undated) DEVICE — GLOVE, SURGICAL, PROTEXIS PI , 7.5, PF, LF

## (undated) DEVICE — RETRIEVAL SYSTEM, MONARCH, 10MM DISP ENDOSCOPIC

## (undated) DEVICE — Device

## (undated) DEVICE — LIGASURE, V SEALER/DIVIDER  5MM BLUNT TIP

## (undated) DEVICE — SOLUTION, IRRIGATION, STERILE WATER, 1000 ML, POUR BOTTLE

## (undated) DEVICE — TOWEL PACK, STERILE, 4/PACK, BLUE

## (undated) DEVICE — STAPLER, ECHELON 3000, 45MM STD

## (undated) DEVICE — SOLUTION, IRRIGATION, SODIUM CHLORIDE 0.9%, 1000 ML, POUR BOTTLE